# Patient Record
Sex: FEMALE | Race: WHITE | NOT HISPANIC OR LATINO | URBAN - METROPOLITAN AREA
[De-identification: names, ages, dates, MRNs, and addresses within clinical notes are randomized per-mention and may not be internally consistent; named-entity substitution may affect disease eponyms.]

---

## 2017-11-07 ENCOUNTER — APPOINTMENT (RX ONLY)
Dept: URBAN - METROPOLITAN AREA CLINIC 369 | Facility: CLINIC | Age: 32
Setting detail: DERMATOLOGY
End: 2017-11-07

## 2017-11-07 DIAGNOSIS — L81.4 OTHER MELANIN HYPERPIGMENTATION: ICD-10-CM

## 2017-11-07 DIAGNOSIS — Z41.9 ENCOUNTER FOR PROCEDURE FOR PURPOSES OTHER THAN REMEDYING HEALTH STATE, UNSPECIFIED: ICD-10-CM

## 2017-11-07 PROCEDURE — ? MEDICAL CONSULTATION: BROWN SPOTS

## 2017-11-07 PROCEDURE — ? RECOMMENDATIONS

## 2017-11-07 PROCEDURE — ? CONSULTATION: VEIN REMOVAL

## 2017-11-07 NOTE — PROCEDURE: RECOMMENDATIONS
Recommendations (Free Text): Discussed that we would not recommend vein removal for the visible veins near the eyes, the veins themselves are not pathogenic, but because of the patient's skin tone, they appear more visible. However, these veins are not cosmetically removable.\\n\\nFor leg veins on shin, do not appear to be varicose veins or diseased veins. They are more transparent due to patient's skin tone. Patient can have consultation with Dr. Marinelli to see if anything can be done.
Detail Level: Zone
Recommendation Preamble: The following recommendations were made during the visit:

## 2017-11-07 NOTE — PROCEDURE: CONSULTATION: VEIN REMOVAL
Left Leg Inflammation: 0- None
Left Dorsalis Pedis Pulse: 2 (Easily palpable)
Detail Level: Detailed
Right Leg Circumference: medium
Right Leg Compression Therapy: 0- None or noncompliant
Include Left Vcss In Note: Yes
Include Vcss In The Note?: No
Left Leg Venous Hyperpigmentation: 0- None or focal low intensity (tan)